# Patient Record
Sex: FEMALE | Race: WHITE | NOT HISPANIC OR LATINO | URBAN - NONMETROPOLITAN AREA
[De-identification: names, ages, dates, MRNs, and addresses within clinical notes are randomized per-mention and may not be internally consistent; named-entity substitution may affect disease eponyms.]

---

## 2019-08-01 DIAGNOSIS — R52 PAIN: Primary | ICD-10-CM

## 2019-08-02 ENCOUNTER — OFFICE VISIT (OUTPATIENT)
Dept: ORTHOPEDIC SURGERY | Facility: CLINIC | Age: 59
End: 2019-08-02

## 2019-08-02 VITALS — WEIGHT: 230 LBS | BODY MASS INDEX: 36.1 KG/M2 | HEIGHT: 67 IN

## 2019-08-02 DIAGNOSIS — M25.511 ACUTE PAIN OF RIGHT SHOULDER: Primary | ICD-10-CM

## 2019-08-02 DIAGNOSIS — M75.41 IMPINGEMENT SYNDROME OF RIGHT SHOULDER: ICD-10-CM

## 2019-08-02 PROCEDURE — 20610 DRAIN/INJ JOINT/BURSA W/O US: CPT | Performed by: ORTHOPAEDIC SURGERY

## 2019-08-02 PROCEDURE — 99203 OFFICE O/P NEW LOW 30 MIN: CPT | Performed by: ORTHOPAEDIC SURGERY

## 2019-08-02 RX ORDER — CITALOPRAM 20 MG/1
TABLET ORAL
Refills: 1 | COMMUNITY
Start: 2019-05-16

## 2019-08-02 RX ORDER — LIDOCAINE HYDROCHLORIDE 20 MG/ML
2 INJECTION, SOLUTION INFILTRATION; PERINEURAL
Status: COMPLETED | OUTPATIENT
Start: 2019-08-02 | End: 2019-08-02

## 2019-08-02 RX ORDER — TRIAMCINOLONE ACETONIDE 40 MG/ML
40 INJECTION, SUSPENSION INTRA-ARTICULAR; INTRAMUSCULAR
Status: COMPLETED | OUTPATIENT
Start: 2019-08-02 | End: 2019-08-02

## 2019-08-02 RX ORDER — SIMVASTATIN 40 MG
TABLET ORAL
Refills: 1 | COMMUNITY
Start: 2019-06-09

## 2019-08-02 RX ORDER — ZALEPLON 5 MG/1
5 CAPSULE ORAL NIGHTLY
COMMUNITY

## 2019-08-02 RX ADMIN — LIDOCAINE HYDROCHLORIDE 2 ML: 20 INJECTION, SOLUTION INFILTRATION; PERINEURAL at 14:16

## 2019-08-02 RX ADMIN — TRIAMCINOLONE ACETONIDE 40 MG: 40 INJECTION, SUSPENSION INTRA-ARTICULAR; INTRAMUSCULAR at 14:16

## 2022-03-17 ENCOUNTER — TELEMEDICINE (OUTPATIENT)
Dept: FAMILY MEDICINE CLINIC | Facility: CLINIC | Age: 62
End: 2022-03-17

## 2022-03-17 VITALS — WEIGHT: 235 LBS | HEIGHT: 67 IN | BODY MASS INDEX: 36.88 KG/M2

## 2022-03-17 DIAGNOSIS — I10 PRIMARY HYPERTENSION: ICD-10-CM

## 2022-03-17 DIAGNOSIS — G89.29 CHRONIC BILATERAL LOW BACK PAIN WITHOUT SCIATICA: ICD-10-CM

## 2022-03-17 DIAGNOSIS — E66.9 OBESITY, CLASS II, BMI 35-39.9: Primary | ICD-10-CM

## 2022-03-17 DIAGNOSIS — M54.50 CHRONIC BILATERAL LOW BACK PAIN WITHOUT SCIATICA: ICD-10-CM

## 2022-03-17 DIAGNOSIS — M25.551 BILATERAL HIP PAIN: ICD-10-CM

## 2022-03-17 DIAGNOSIS — M25.552 BILATERAL HIP PAIN: ICD-10-CM

## 2022-03-17 DIAGNOSIS — E78.2 MIXED HYPERLIPIDEMIA: ICD-10-CM

## 2022-03-17 PROCEDURE — 99203 OFFICE O/P NEW LOW 30 MIN: CPT | Performed by: FAMILY MEDICINE

## 2022-03-17 RX ORDER — SEMAGLUTIDE 0.25 MG/.5ML
0.25 INJECTION, SOLUTION SUBCUTANEOUS WEEKLY
Qty: 2 ML | Refills: 0 | Status: SHIPPED | OUTPATIENT
Start: 2022-03-17 | End: 2022-06-21 | Stop reason: SDUPTHER

## 2022-03-17 NOTE — PROGRESS NOTES
"Subjective   Kaci Mancini is a 62 y.o. female.   Seen today by video visit due to the Covid-19 quarantine.   You have chosen to receive care through a telehealth visit.  Do you consent to use a video/audio connection for your medical care today? Yes  Patient seeing me today with concerns about her weight.  She has tried multiple diets in the past few years including low-fat low-carb and keto but has been unable to achieve and maintain any meaningful amount of weight loss.  She has multiple issues which would be improved with weight loss including hypertension hyperlipidemia and back and hip pain.  She has been experiencing more hip pain lately and has been told she needs to lose weight.  She is interested in pharmacologic assistance further weight loss.  She would not be a good candidate for phentermine due to her blood pressure.  History of Present Illness    The following portions of the patient's history were reviewed and updated as appropriate: allergies, current medications, past family history, past medical history, past social history, past surgical history and problem list.    Review of Systems   Constitutional: Negative.    HENT: Negative.    Respiratory: Negative.  Negative for shortness of breath.    Cardiovascular: Negative.  Negative for chest pain.   Gastrointestinal: Negative.    Musculoskeletal: Positive for arthralgias, back pain, joint swelling and myalgias.   Skin: Negative.    Allergic/Immunologic: Negative for immunocompromised state.   Neurological: Negative for dizziness, tremors, seizures, syncope, weakness and numbness.   Hematological: Negative.    Psychiatric/Behavioral: Negative for agitation, confusion, dysphoric mood and sleep disturbance. The patient is not nervous/anxious.    All other systems reviewed and are negative.      Objective    Body mass index is 36.81 kg/m².  Vitals:    03/17/22 1530   Weight: 107 kg (235 lb)   Height: 170.2 cm (67\")       Physical " Exam  Constitutional:       General: She is not in acute distress.     Appearance: She is well-developed.   HENT:      Head: Normocephalic and atraumatic.   Eyes:      General:         Right eye: No discharge.         Left eye: No discharge.      Pupils: Pupils are equal, round, and reactive to light.   Pulmonary:      Effort: Pulmonary effort is normal.   Neurological:      Mental Status: She is alert and oriented to person, place, and time.   Psychiatric:         Behavior: Behavior normal.         Thought Content: Thought content normal.         Judgment: Judgment normal.         Assessment/Plan   Diagnoses and all orders for this visit:    1. Obesity, Class II, BMI 35-39.9 (Primary)  -     Semaglutide-Weight Management (Wegovy) 0.25 MG/0.5ML solution auto-injector; Inject 0.25 mg under the skin into the appropriate area as directed 1 (One) Time Per Week.  Dispense: 2 mL; Refill: 0    2. Chronic bilateral low back pain without sciatica    3. Bilateral hip pain    4. Primary hypertension    5. Mixed hyperlipidemia     Patient's (Body mass index is 36.81 kg/m².) indicates that they are obese (BMI >30) with health related conditions that include hypertension, dyslipidemias and Back and hip pain . Weight is worsening. BMI is is above average; BMI management plan is completed. We discussed portion control, increasing exercise and pharmacologic options including Wegovy.     Patient is an excellent candidate for Wegovy for weight loss.  She has multiple sequelae of the obesity which would be improved with the weight loss, as detailed above.  Instructions for the Wegovy are given along with information about diet and exercise changes.  Along with the positive lifestyle changes hopefully we can achieve some meaningful weight loss and will continue monitor every 3 months.        This document has been electronically signed by Sammie Lebron MD on March 17, 2022 15:35 CDT

## 2022-03-23 ENCOUNTER — PRIOR AUTHORIZATION (OUTPATIENT)
Dept: FAMILY MEDICINE CLINIC | Facility: CLINIC | Age: 62
End: 2022-03-23

## 2022-03-24 NOTE — TELEPHONE ENCOUNTER
4/25/2022  BLAINE GAMING (Key: [id number]) - [id number]  Wegovy 0.25MG/0.5ML auto-injectors     Status: PA Response - Approved

## 2022-06-21 ENCOUNTER — TELEPHONE (OUTPATIENT)
Dept: FAMILY MEDICINE CLINIC | Facility: CLINIC | Age: 62
End: 2022-06-21

## 2022-06-21 DIAGNOSIS — E66.9 OBESITY, CLASS II, BMI 35-39.9: ICD-10-CM

## 2022-06-21 RX ORDER — SEMAGLUTIDE 0.25 MG/.5ML
0.25 INJECTION, SOLUTION SUBCUTANEOUS WEEKLY
Qty: 2 ML | Refills: 0 | Status: SHIPPED | OUTPATIENT
Start: 2022-06-21

## 2022-06-21 NOTE — TELEPHONE ENCOUNTER
She is needing her wegovy sent to [facility] in [facility].. the address is [place] [address] and the phone number is [phone]
